# Patient Record
Sex: FEMALE | Race: ASIAN | Employment: UNEMPLOYED | URBAN - METROPOLITAN AREA
[De-identification: names, ages, dates, MRNs, and addresses within clinical notes are randomized per-mention and may not be internally consistent; named-entity substitution may affect disease eponyms.]

---

## 2021-12-23 ENCOUNTER — HOSPITAL ENCOUNTER (EMERGENCY)
Facility: HOSPITAL | Age: 41
Discharge: HOME | End: 2021-12-23
Attending: EMERGENCY MEDICINE | Admitting: EMERGENCY MEDICINE
Payer: COMMERCIAL

## 2021-12-23 VITALS
DIASTOLIC BLOOD PRESSURE: 57 MMHG | WEIGHT: 113 LBS | HEIGHT: 62 IN | SYSTOLIC BLOOD PRESSURE: 154 MMHG | HEART RATE: 87 BPM | BODY MASS INDEX: 20.8 KG/M2 | OXYGEN SATURATION: 98 % | TEMPERATURE: 98.7 F | RESPIRATION RATE: 16 BRPM

## 2021-12-23 DIAGNOSIS — R33.9 URINARY RETENTION: ICD-10-CM

## 2021-12-23 LAB
ALBUMIN UR-MCNC: NEGATIVE MG/DL
ANION GAP SERPL CALCULATED.3IONS-SCNC: 11 MMOL/L (ref 5–18)
APPEARANCE UR: CLEAR
BILIRUB UR QL STRIP: NEGATIVE
BUN SERPL-MCNC: 8 MG/DL (ref 8–22)
CALCIUM SERPL-MCNC: 9.4 MG/DL (ref 8.5–10.5)
CHLORIDE BLD-SCNC: 106 MMOL/L (ref 98–107)
CO2 SERPL-SCNC: 20 MMOL/L (ref 22–31)
COLOR UR AUTO: ABNORMAL
CREAT SERPL-MCNC: 0.69 MG/DL (ref 0.6–1.1)
GFR SERPL CREATININE-BSD FRML MDRD: >90 ML/MIN/1.73M2
GLUCOSE BLD-MCNC: 98 MG/DL (ref 70–125)
GLUCOSE UR STRIP-MCNC: NEGATIVE MG/DL
HCG UR QL: NEGATIVE
HGB UR QL STRIP: NEGATIVE
INTERNAL QC OK POCT: NORMAL
KETONES UR STRIP-MCNC: NEGATIVE MG/DL
LEUKOCYTE ESTERASE UR QL STRIP: NEGATIVE
NITRATE UR QL: NEGATIVE
PH UR STRIP: 5 [PH] (ref 5–7)
POCT KIT EXPIRATION DATE: NORMAL
POCT KIT LOT NUMBER: NORMAL
POTASSIUM BLD-SCNC: 3.6 MMOL/L (ref 3.5–5)
RBC URINE: 1 /HPF
SODIUM SERPL-SCNC: 137 MMOL/L (ref 136–145)
SP GR UR STRIP: 1.01 (ref 1–1.03)
UROBILINOGEN UR STRIP-MCNC: <2 MG/DL
WBC URINE: <1 /HPF

## 2021-12-23 PROCEDURE — 51798 US URINE CAPACITY MEASURE: CPT

## 2021-12-23 PROCEDURE — 99284 EMERGENCY DEPT VISIT MOD MDM: CPT | Mod: 25

## 2021-12-23 PROCEDURE — 81025 URINE PREGNANCY TEST: CPT | Performed by: EMERGENCY MEDICINE

## 2021-12-23 PROCEDURE — 81001 URINALYSIS AUTO W/SCOPE: CPT | Performed by: EMERGENCY MEDICINE

## 2021-12-23 PROCEDURE — 99283 EMERGENCY DEPT VISIT LOW MDM: CPT

## 2021-12-23 PROCEDURE — 80048 BASIC METABOLIC PNL TOTAL CA: CPT | Performed by: EMERGENCY MEDICINE

## 2021-12-23 PROCEDURE — 36415 COLL VENOUS BLD VENIPUNCTURE: CPT | Performed by: EMERGENCY MEDICINE

## 2021-12-23 PROCEDURE — 51702 INSERT TEMP BLADDER CATH: CPT

## 2021-12-23 ASSESSMENT — MIFFLIN-ST. JEOR: SCORE: 1135.81

## 2021-12-23 NOTE — ED PROVIDER NOTES
Emergency Department Encounter     Evaluation Date & Time:   No admission date for patient encounter.    CHIEF COMPLAINT:  Urinary Retention and Abdominal Pain      Triage Note:Pt states she has not been able to pee since 1200 today. Pt's last normal pee was at 0700. Pt is c/o abdominal pain. Pt states this has happened before because of fibroids in her ureter. Pt is suppose to have surgery December 31st.         Impression and Plan     ED COURSE & MEDICAL DECISION MAKING:    Patient having urinary retention.  Bladder scan in waiting room triage room shows greater than 700cc and she is quite distended.  We discussed options for drainage as discussed in my previous note earlier today and sierra placed.  She had 1200cc out and started to be blood tinged, so then it ws clamped about 15 minutes and then reopened and drained another 450cc urine.  Checked for any renal damage, but none found from the obstruction.  No uti found.  She had instructions from tech on how to care fr the sierra at home.  She will call her surgeon Monday to discuss if they want her to come to clinic before surgery to have it removed or just wait unitl surgery when they can remove it afterwards.  She was qite comfortable once bladder drained.    Discharged home from waiting room.       At the conclusion of the encounter I discussed the results of all the tests and the disposition. The questions were answered. The patient or family acknowledged understanding and was agreeable with the care plan.        FINAL IMPRESSION:    ICD-10-CM    1. Urinary retention  R33.9        0 minutes of critical care time        MEDICATIONS GIVEN IN THE EMERGENCY DEPARTMENT:  Medications - No data to display    NEW PRESCRIPTIONS STARTED AT TODAY'S ED VISIT:  New Prescriptions    No medications on file       HPI     HPI     Naw E Chetna is a 41 year old female with a pertinent history of uterine fibroid who presents to this ED today for evaluation of being unable to  "urinate today.    Marina Basilio is a 41 year old female who presents to the ED with urinary retention started earlier today. Had this once before many years ago with uti, but recently is having issues with [facility]. No fever, no vomiting. Prior to today no symptoms of uti.    REVIEW OF SYSTEMS:  Review of Systems  remainder of systems are all otherwise negative.        Medical History     No past medical history on file.chart shown to me from her [facility] my chart shows uterine leiomyoma    No past surgical history on file.  Patient denies past surgical history.    No family history on file.    Social History     Tobacco Use     Smoking status: Not on file     Smokeless tobacco: Not on file   Substance Use Topics     Alcohol use: Not on file     Drug use: Not on file       No current outpatient medications on file.      Physical Exam     First Vitals:  Patient Vitals for the past 24 hrs:   BP Temp Temp src Pulse Resp SpO2 Height Weight   12/23/21 1547 123/75 98.7  F (37.1  C) Oral 104 16 98 % 1.575 m (5' 2\") 51.3 kg (113 lb)       PHYSICAL EXAM:   Constitutional:   In nad seen lying down on triage bed.   HENT:  Normocephalic, wearing mask  Eyes:  PERRL, EOMI, Conjunctiva normal, No discharge, no scleral icterus.  Respiratory:  Breathing easily, cta  Cardiovascular:  rrr nl s1s2 0 murmurs, rubs, or gallops.  Peripheral pulses dp, pt, and radial are wnl.  no peripheral edema   GI:  Bowel sounds normal, soft except fullness suprapubic area to above umbilicus  :distended in suprapubic area severely and that area is ttp.  Musculoskeletal:  Moves all extremities.  No erythematous or swollen major joints,   Integument:  Normal color, no rash.  Lymphatic:  No cervical lymphadenopathy  Neurologic:  Alert & oriented x 3, Normal motor function, Normal sensory function, No focal deficits noted. Normal speech.  Psychiatric:  Affect " normal, Judgment normal, Mood normal.     Results     LAB:  All pertinent labs reviewed and interpreted  Results for orders placed or performed during the hospital encounter of 12/23/21   UA with Microscopic reflex to Culture     Status: Abnormal    Specimen: Urine, Ferguson Catheter   Result Value Ref Range    Color Urine Carol (A) Colorless, Straw, Light Yellow, Yellow    Appearance Urine Clear Clear    Glucose Urine Negative Negative mg/dL    Bilirubin Urine Negative Negative    Ketones Urine Negative Negative mg/dL    Specific Gravity Urine 1.006 1.001 - 1.030    Blood Urine Negative Negative    pH Urine 5.0 5.0 - 7.0    Protein Albumin Urine Negative Negative mg/dL    Urobilinogen Urine <2.0 <2.0 mg/dL    Nitrite Urine Negative Negative    Leukocyte Esterase Urine Negative Negative    RBC Urine 1 <=2 /HPF    WBC Urine <1 <=5 /HPF    Narrative    Urine Culture not indicated   Basic metabolic panel     Status: Abnormal   Result Value Ref Range    Sodium 137 136 - 145 mmol/L    Potassium 3.6 3.5 - 5.0 mmol/L    Chloride 106 98 - 107 mmol/L    Carbon Dioxide (CO2) 20 (L) 22 - 31 mmol/L    Anion Gap 11 5 - 18 mmol/L    Urea Nitrogen 8 8 - 22 mg/dL    Creatinine 0.69 0.60 - 1.10 mg/dL    Calcium 9.4 8.5 - 10.5 mg/dL    Glucose 98 70 - 125 mg/dL    GFR Estimate >90 >60 mL/min/1.73m2   HCG qualitative urine POCT     Status: Normal   Result Value Ref Range    HCG Qual Urine Negative Negative    Internal QC Check POCT Valid Valid    POCT Kit Lot Number [id number]     POCT Kit Expiration Date 2023-03-31        RADIOLOGY:  No results found.    Fernanda Joshi MD  Emergency Medicine  [facility] EMERGENCY DEPARTMENT         Fernanda Joshi MD  12/23/21 4768

## 2021-12-23 NOTE — ED NOTES
"ED Triage Provider Note  [facility]  Encounter Date: Dec 23, 2021    History:  Chief Complaint   Patient presents with     Urinary Retention     Abdominal Pain     Naw KIMMIE Basilio is a 41 year old female who presents to the ED with urinary retention started earlier today. Had this once before many years ago with uti, but recently is having issues with a large [facility] to [facility] on 12/[facility]/21. No fever, no vomiting. Prior to today no symptoms of uti.    Review of Systems:  Review of Systems   abd pain    Exam:  /75   Pulse 104   Temp 98.7  F (37.1  C) (Oral)   Resp 16   Ht 1.575 m (5' 2\")   Wt 51.3 kg (113 lb)   LMP 11/23/2021 (Approximate)   SpO2 98%   BMI 20.67 kg/m    General: No acute distress. Appears stated age.   Cardio: Regular rate, extremities well perfused  Resp: Normal work of breathing, grossly normal respiratory rate  Abd:  Very distended suprapubic area.    Neuro: Alert. CN II-XII grossly intact. Grossly intact strength.       Medical Decision Making:  Patient arriving to the ED with problem as above. A medical screening exam was performed. Labs/sierra orders initiated from Triage. The patient is appropriate to wait in triage.     Discussed [facility] patient that cannot r/o that this is due to her fibroid obstructing her bladder flow.  We could catheterize and drain, and she goes home without the ctheter but no guarantee that this wouldn' trecur before surgery to where she would have to return to the ER, or we put in a sierra that stays in until she calls her surgeon and either they schedule to have it removed day of surgery or before.  She prefers the sierra to go home with.  Will check for any renal dysfunction, uti, as well prior to discharge as having surgery soon.  Due to have er menses within a day or so.    The patient will be seen in the ED for final evaluation and disposition.     Fernanda Joshi on 12/23/2021 at " 4:08 PM      Lab/Imaging Results:       Interventions:  Medications - No data to display    Diagnosis:  Urinary retnetion      Fernanda Joshi MD  Emergency Medicine  [facility] EMERGENCY DEPARTMENT       Fernanda Joshi MD  12/23/21 [id number]

## 2021-12-23 NOTE — ED TRIAGE NOTES
Pt states she has not been able to pee since 1200 today. Pt's last normal pee was at 0700. Pt is c/o abdominal pain. Pt states this has happened before because of fibroids in her ureter. Pt is suppose to have surgery December 31st.

## 2021-12-23 NOTE — DISCHARGE INSTRUCTIONS
"Call your surgeon on Monday to tell them about getting the \"catheter\" put in your bladder.  See if they want you to go to clinic before your surgery to have it removed, or wait until you go to your surgery.    Return to the ER for any increasing pain, fever, or if you are not draining urine for a couple of hours and begin to feel pressure building up.    Hopefully once your fibroid is removed this problem will go away, but after surgery that can even take a few days for the swelling in the area to get better.  "

## 2021-12-24 ENCOUNTER — HOSPITAL ENCOUNTER (EMERGENCY)
Facility: HOSPITAL | Age: 41
Discharge: HOME | End: 2021-12-24
Admitting: STUDENT IN AN ORGANIZED HEALTH CARE EDUCATION/TRAINING PROGRAM
Payer: COMMERCIAL

## 2021-12-24 VITALS
TEMPERATURE: 98.9 F | OXYGEN SATURATION: 99 % | RESPIRATION RATE: 18 BRPM | HEART RATE: 89 BPM | WEIGHT: 113 LBS | BODY MASS INDEX: 20.67 KG/M2 | DIASTOLIC BLOOD PRESSURE: 55 MMHG | SYSTOLIC BLOOD PRESSURE: 105 MMHG

## 2021-12-24 DIAGNOSIS — Z46.6 ENCOUNTER FOR REMOVAL OF URINARY CATHETER: ICD-10-CM

## 2021-12-24 PROCEDURE — 99284 EMERGENCY DEPT VISIT MOD MDM: CPT | Mod: 25

## 2021-12-24 PROCEDURE — 99282 EMERGENCY DEPT VISIT SF MDM: CPT

## 2021-12-24 PROCEDURE — 51798 US URINE CAPACITY MEASURE: CPT

## 2021-12-24 NOTE — ED TRIAGE NOTES
Patient arrives by private car states she would like her urinary catheter removed.  Was placed yesterday.  Patient states that the urine is not draining into the bag, but is coming out around the catheter.

## 2021-12-24 NOTE — DISCHARGE INSTRUCTIONS
We have removed your catheter here given you are concerned that it was not draining properly and it was uncomfortable.  We are not going to replace this against her will but do recommend a replacement today.  It is very possible that you will not be able to urinate again and may need to return to the ER for catheter placement again.    Please return if you continue to have difficulties urinating, have abdominal pain or fevers

## 2021-12-24 NOTE — ED PROVIDER NOTES
ED PROVIDER NOTE    EMERGENCY DEPARTMENT ENCOUNTER      NAME: Marina Basilio  AGE: 41 year old female  YOB: 1980  MRN: [id number]  EVALUATION DATE & TIME: 12/24/2021  8:56 AM    PCP: Rosa Conrad    ED PROVIDER: Vicki Flores PA-C      Chief Complaint   Patient presents with     Catheter Problem         FINAL IMPRESSION:  1. Encounter for removal of urinary catheter          MEDICAL DECISION MAKING:    Pertinent Labs & Imaging studies reviewed. (See chart for details)  41 year old female with a h/o large uterine fibroid presents to the Emergency Department for evaluation of catheter concerns.    Patient had a urinary catheter placed yesterday after presenting to the emergency department with urinary retention.  She had over 1200 cc out.  BMP did not reveal any signs of NICKIE.  Her urinalysis did not reveal signs of infection and she has not had any infectious symptoms.  She is likely experiencing this retention due to a large uterine fibroid.  She is scheduled to see her surgeon on Monday to discuss options.  Is planning on having surgical removal.    She reports that since this morning the catheter has not been draining appropriately.  It sounds like it may have been pulled a little and so is now leaking around the actual catheter.    Bladder scan here was not consistent.  I suspect this is due to the large fibroid that is interfering with the analysis.  Catheter was removed nonetheless.  I did do a bedside ultrasound and could clearly see a decompressed bladder and large fibroid, so it does look like she was draining.  She was able to urinate on her own and after shared decision making elected to go home without a catheter in.  I did recommend that we replace the catheter as there is a high likelihood she may have recurrent retention until she deals with her fibroid.  She understands this risk and still would like to go home without a catheter.  She knows to return to the emergency department if  she does start to have retention, pain or fever    At the conclusion of the encounter I discussed the results of all of the tests and the disposition. The questions were answered. The patient or family acknowledged understanding and was agreeable with the care plan.       ED COURSE  9:12 AM Met and evaluated patient. Discussed ED plan.   9:43 AM Performed a bedside ultrasound. Bladder is decompressed  10:05 AM Patient urinated on own in ED. Ready for discharge      MEDICATIONS GIVEN IN THE EMERGENCY:  Medications - No data to display    NEW PRESCRIPTIONS STARTED AT TODAY'S ER VISIT  New Prescriptions    No medications on file          =================================================================    HPI    Patient information was obtained from: Patient     Use of Intrepreter: Yes- (Vocera) Laury Basilio is a 41 year old female with a pertinent medical history of uterine leiomyoma who presents to the Emergency Department for the evaluation of a catheter issue.    Per chart review, patient was seen 12/23/21 at [facility] Emergency Department for the evaluation of urinary retention and abdominal pain.  Bladder scan in waiting room triage room shows greater than 700cc and she is quite distended.  We discussed options for drainage as discussed in my previous note earlier today and sierra placed.  She had 1200cc out and started to be blood tinged, so then it ws clamped about 15 minutes and then reopened and drained another 450cc urine.  Checked for any renal damage, but none found from the obstruction.  No uti found.  She had instructions from tech on how to care fr the sierra at home.  She will call her surgeon Monday to discuss if they want her to come to clinic before surgery to have it removed or just wait unitl surgery when they can remove it afterwards.  She was qite comfortable once bladder drained. Patient was discharged home from waiting room.    Patient reports believing  that the catheter tube came off. She states the urine is not going into the catheter bag and instead is leaking outside of the tube and around the area. Patient believes this occurred ~7 AM today. She states she was attempting to urinate but notes she was not able to empty it. She states last night she changed her bag to a bigger one and notes that the tube felt slightly heavy but is unsure whether or not it came out. She endorses feeling a sensation of increased pressure. Patient denies any fever, chills, pain, nausea, vomiting as well as any other complaints at the time.     REVIEW OF SYSTEMS   Constitutional: Negative for fevers, chills.  GI:Negative for nausea, vomiting, diarrhea, abdominal pain  : Positive for leaking urine.     All other systems reviewed and are negative      PAST MEDICAL HISTORY:  History reviewed. No pertinent past medical history.    PAST SURGICAL HISTORY:  History reviewed. No pertinent surgical history.        CURRENT MEDICATIONS:    No current facility-administered medications for this encounter.  No current outpatient medications on file.    ALLERGIES:  No Known Allergies    FAMILY HISTORY:  No family history on file.        VITALS:  Vitals:    12/24/21 0854   BP: 105/55   Pulse: 89   Resp: 18   Temp: 98.9  F (37.2  C)   TempSrc: Tympanic   SpO2: 99%   Weight: 51.3 kg (113 lb)       PHYSICAL EXAM    General Appearance:  Alert, cooperative, no distress, appears stated age  HENT: Normocephalic without obvious deformity, atraumatic. Mucous membranes moist   Eyes: Conjunctiva clear, Lids normal. No discharge.   Respiratory: No distress. Lungs clear to ausculation bilaterally. No wheezes, rhonchi or stridor  Cardiovascular: Regular rate and rhythm, no murmur.   GI: Abdomen soft, nontender. Palpable mass over uterine region.    Musculoskeletal: Moving all extremities. No gross deformities  Integument: Warm, dry, no rashes or lesions  Neurologic: Alert and orientated x3. No focal  deficits.  Psych: Normal mood and affect        LAB:  Labs Ordered and Resulted from Time of ED Arrival to Time of ED Departure - No data to display    RADIOLOGY:  No orders to display       I, Nishi Palumbo, am serving as a scribe to document services personally performed by Vicki Flores PA-C based on my observation and the provider's statements to me. I, Vicki Flores PA-C attest that Nishi Palumbo is acting in a scribe capacity, has observed my performance of the services and has documented them in accordance with my direction.    Vicki Flores PA-C   Emergency Medicine     Vicki Flores PA-C  12/24/21 1003

## 2023-08-13 ENCOUNTER — HEALTH MAINTENANCE LETTER (OUTPATIENT)
Age: 43
End: 2023-08-13

## 2023-10-31 ASSESSMENT — ANXIETY QUESTIONNAIRES
5. BEING SO RESTLESS THAT IT IS HARD TO SIT STILL: NOT AT ALL
3. WORRYING TOO MUCH ABOUT DIFFERENT THINGS: NOT AT ALL
6. BECOMING EASILY ANNOYED OR IRRITABLE: NOT AT ALL
7. FEELING AFRAID AS IF SOMETHING AWFUL MIGHT HAPPEN: NOT AT ALL
1. FEELING NERVOUS, ANXIOUS, OR ON EDGE: NOT AT ALL
IF YOU CHECKED OFF ANY PROBLEMS ON THIS QUESTIONNAIRE, HOW DIFFICULT HAVE THESE PROBLEMS MADE IT FOR YOU TO DO YOUR WORK, TAKE CARE OF THINGS AT HOME, OR GET ALONG WITH OTHER PEOPLE: NOT DIFFICULT AT ALL
2. NOT BEING ABLE TO STOP OR CONTROL WORRYING: NOT AT ALL
4. TROUBLE RELAXING: NOT AT ALL
GAD7 TOTAL SCORE: 0

## 2023-11-03 ASSESSMENT — ANXIETY QUESTIONNAIRES
3. WORRYING TOO MUCH ABOUT DIFFERENT THINGS: NOT AT ALL
6. BECOMING EASILY ANNOYED OR IRRITABLE: NOT AT ALL
4. TROUBLE RELAXING: NOT AT ALL
5. BEING SO RESTLESS THAT IT IS HARD TO SIT STILL: NOT AT ALL
GAD7 TOTAL SCORE: 0
GAD7 TOTAL SCORE: 0
7. FEELING AFRAID AS IF SOMETHING AWFUL MIGHT HAPPEN: NOT AT ALL
2. NOT BEING ABLE TO STOP OR CONTROL WORRYING: NOT AT ALL
IF YOU CHECKED OFF ANY PROBLEMS ON THIS QUESTIONNAIRE, HOW DIFFICULT HAVE THESE PROBLEMS MADE IT FOR YOU TO DO YOUR WORK, TAKE CARE OF THINGS AT HOME, OR GET ALONG WITH OTHER PEOPLE: NOT DIFFICULT AT ALL
1. FEELING NERVOUS, ANXIOUS, OR ON EDGE: NOT AT ALL

## 2023-11-07 ENCOUNTER — OFFICE VISIT (OUTPATIENT)
Dept: OBGYN | Facility: CLINIC | Age: 43
End: 2023-11-07
Payer: COMMERCIAL

## 2023-11-07 VITALS
DIASTOLIC BLOOD PRESSURE: 60 MMHG | OXYGEN SATURATION: 98 % | SYSTOLIC BLOOD PRESSURE: 102 MMHG | BODY MASS INDEX: 20.24 KG/M2 | HEIGHT: 62 IN | WEIGHT: 110 LBS | HEART RATE: 75 BPM

## 2023-11-07 DIAGNOSIS — R10.2 PELVIC PAIN IN FEMALE: Primary | ICD-10-CM

## 2023-11-07 PROCEDURE — 99203 OFFICE O/P NEW LOW 30 MIN: CPT | Performed by: OBSTETRICS & GYNECOLOGY

## 2023-11-07 RX ORDER — MULTIVIT-MIN/IRON/FOLIC ACID/K 18-600-40
CAPSULE ORAL
COMMUNITY

## 2023-11-07 RX ORDER — VITAMIN B COMPLEX
1000 TABLET ORAL DAILY
COMMUNITY

## 2023-11-07 NOTE — PROGRESS NOTES
CC: Marina Beeoo is here secondary to pelvic pain.    HPI: The pt is a 43 year old SAF P0 who presents with intermittent pain similar to what she had before her myomectomy on 12/31/21.  At that time, she had 7 fibroids removed through laparotomy.  She notes now that with her periods and with some foods, like increased meat consumption, she has increased pain.  It lasts about a day when it comes.  Tylenol helps reduce the pain.    No past medical history on file.    Past Surgical History:   Procedure Laterality Date    KIDNEY SURGERY      MYOMECTOMY ABDOMINAL APPROACH  12/31/2021    7 fibroids removed       Patient's   Family History   Problem Relation Age of Onset    No Known Problems Mother     No Known Problems Father     No Known Problems Sister     No Known Problems Sister     No Known Problems Sister     No Known Problems Brother     No Known Problems Maternal Grandmother     No Known Problems Maternal Grandfather     Breast Cancer Paternal Grandmother     No Known Problems Paternal Grandfather        Patient   Social History     Socioeconomic History    Marital status: Single     Spouse name: None    Number of children: None    Years of education: None    Highest education level: None   Tobacco Use    Smoking status: Never     Passive exposure: Never    Smokeless tobacco: Never   Substance and Sexual Activity    Alcohol use: Never    Drug use: Never       Outpatient Medications Prior to Visit   Medication Sig Dispense Refill    Ascorbic Acid (VITAMIN C) 500 MG CAPS       calcium-magnesium (CALMAG) 500-250 MG TABS per tablet Take 1 tablet by mouth 2 times daily      Vitamin D3 (CHOLECALCIFEROL) 25 mcg (1000 units) tablet Take 1,000 Units by mouth daily      ZINC METHIONATE PO Take 50 mg by mouth daily       No facility-administered medications prior to visit.       Patient has No Known Allergies.    ROS:  12 part ROS is negative aside from those symptoms in the HPI    PE:  /60 (BP Location: Right arm,  "Patient Position: Sitting, Cuff Size: Adult Regular)   Pulse 75   Ht 1.581 m (5' 2.25\")   Wt 49.9 kg (110 lb)   LMP 10/25/2023           Body mass index is 19.96 kg/m .    General: WN/WD AF, NAD  Psych: normal mood  Neuro: CN I-XII grossly intact  MS: normal gait    Assessment: 43 year old SAF P0 with pelvic pain and a history of myomectomy    Plan: Natural history of fibroids recurring discussed with the patient.  We discussed that if there are fibroids present they could be related to her pain.  Ultrasound was ordered to further evaluate her uterus.  We discussed that if they are back, myomectomy is one option but there are others as well.  If there are not fibroids, other causes for her pain need to be evaluated.  Questions were answered to the best of my ability.    20 minutes spent on the date of the encounter doing chart review, review of outside records, review of test results, interpretation of tests, patient visit, and documentation   Answers submitted by the patient for this visit:  YESSENIA-7 (Submitted on 11/3/2023)  YESSENIA 7 TOTAL SCORE: 0    "

## 2023-11-08 ENCOUNTER — HOSPITAL ENCOUNTER (OUTPATIENT)
Dept: ULTRASOUND IMAGING | Facility: HOSPITAL | Age: 43
Discharge: HOME | End: 2023-11-08
Attending: OBSTETRICS & GYNECOLOGY | Admitting: OBSTETRICS & GYNECOLOGY
Payer: COMMERCIAL

## 2023-11-08 DIAGNOSIS — R10.2 PELVIC PAIN IN FEMALE: ICD-10-CM

## 2023-11-08 PROCEDURE — 76830 TRANSVAGINAL US NON-OB: CPT

## 2024-10-05 ENCOUNTER — HEALTH MAINTENANCE LETTER (OUTPATIENT)
Age: 44
End: 2024-10-05

## 2025-01-19 ENCOUNTER — HEALTH MAINTENANCE LETTER (OUTPATIENT)
Age: 45
End: 2025-01-19